# Patient Record
(demographics unavailable — no encounter records)

---

## 2024-10-23 NOTE — HISTORY OF PRESENT ILLNESS
[FreeTextEntry1] : Patient is doing well. I the past few weeks, patient has not been OOB during the day or coughing at night. Patient is able to run around and play. mom is asking for school form. Patient does not have trouble breathing with exercise. Mom states that he has improved a lot, he does not have allergies, cough. Before, he had trouble breathing but has now improved.   No hospitalization, emergency department, urgent care, unscheduled PMD visit for asthma, no systemic steroid, no absence from school// parents take leave because of pt asthma.  symptoms are  controlled by RULES OF TWO's   ID: [id number]

## 2024-10-23 NOTE — DISCUSSION/SUMMARY
[Mild] : Mild persistent [FreeTextEntry1] : Patient is a 10 year old male presenting for asthma evaluation. We will be able to write a better for the school. We recommend him getting 2 puffs before exercise. Since it's getting cold outside and he runs in the cold, this is more likely to trigger an asthma attack. Will give a school note for administration of albuterol prior to exercise. Recommend to go to nurse to get the treatment at school. If it happens more than once a month will order treatment. Encouraged to take all medicines for the winter, and in the spring will decrease medicine depending on symptom improvement. Recommend return to clinic in 3-4 months.   discuss asthma management plan for high risk season, controller adjustment ,  discuss exacerbation  recognition,  guardian expressed understanding.  discussed rx for exercise induced asthma Influenza vaccine and recommended vaccines recommended .Discussed trigger and environmental control,  Action/ plan discussed with verbal understanding, (please see patient discussion, assessment  sections and patient  education above )   total time spent  30 minutes

## 2024-10-23 NOTE — HISTORY OF PRESENT ILLNESS
[FreeTextEntry1] : Patient is doing well. I the past few weeks, patient has not been OOB during the day or coughing at night. Patient is able to run around and play. mom is asking for school form. Patient does not have trouble breathing with exercise. Mom states that he has improved a lot, he does not have allergies, cough. Before, he had trouble breathing but has now improved.   No hospitalization, emergency department, urgent care, unscheduled PMD visit for asthma, no systemic steroid, no absence from school// parents take leave because of pt asthma.  symptoms are  controlled by RULES OF TWO's   ID: [id number]

## 2024-10-24 NOTE — HISTORY OF PRESENT ILLNESS
[de-identified] : FOLLOWUP VISIT FOR: Obesity and abnormal liver function test.  He has gained weight since his last visit.  He is followed by nutrition.  Most recent labs show an abnormal ALT of 70 with a normal AST.  Labs prior to this on 3- showed a normal AST and an ALT of 97.  The ALT appears to be downtrending.  Clinically he is asymptomatic.  There is no complaint of abdominal pain, vomiting, constipation, blood in the stool  AGGRAVATING FACTORS: None  ALLEVIATING FACTORS: None  PERTINENT NEGATIVES: No cough or fever   INDEPENDENT HISTORIAN: Mother  REVIEW OF EXTERNAL NOTES: Note from JONO Rolon on 9- was reviewed  REVIEW OF RESULTS: Labs from 9- were reviewed.  The hepatic panel showed a normal AST and an abnormal ALT  TESTS ORDERED: Abdominal ultrasound  INDEPENDENT INTERPRETATION OF TESTS PERFORMED BY ANOTHER PROVIDER:  Labs from 9- were reviewed.  The CMP, cholesterol, triglycerides and CBC were within normal limits

## 2024-10-24 NOTE — CONSULT LETTER
[Dear  ___] : Dear  [unfilled], [Consult Letter:] : I had the pleasure of evaluating your patient, [unfilled]. [Please see my note below.] : Please see my note below. [Consult Closing:] : Thank you very much for allowing me to participate in the care of this patient.  If you have any questions, please do not hesitate to contact me. [Sincerely,] : Sincerely, [FreeTextEntry3] : Latoya Ferreira M.D. Director of Pediatric Gastroenterology and Nutrition [facility]

## 2024-10-24 NOTE — HISTORY OF PRESENT ILLNESS
[de-identified] : FOLLOWUP VISIT FOR: Obesity and abnormal liver function test.  He has gained weight since his last visit.  He is followed by nutrition.  Most recent labs show an abnormal ALT of 70 with a normal AST.  Labs prior to this on 3- showed a normal AST and an ALT of 97.  The ALT appears to be downtrending.  Clinically he is asymptomatic.  There is no complaint of abdominal pain, vomiting, constipation, blood in the stool  AGGRAVATING FACTORS: None  ALLEVIATING FACTORS: None  PERTINENT NEGATIVES: No cough or fever   INDEPENDENT HISTORIAN: Mother  REVIEW OF EXTERNAL NOTES: Note from JONO Rolon on 9- was reviewed  REVIEW OF RESULTS: Labs from 9- were reviewed.  The hepatic panel showed a normal AST and an abnormal ALT  TESTS ORDERED: Abdominal ultrasound  INDEPENDENT INTERPRETATION OF TESTS PERFORMED BY ANOTHER PROVIDER:  Labs from 9- were reviewed.  The CMP, cholesterol, triglycerides and CBC were within normal limits

## 2024-10-24 NOTE — CONSULT LETTER
[Dear  ___] : Dear  [unfilled], [Consult Letter:] : I had the pleasure of evaluating your patient, [unfilled]. [Please see my note below.] : Please see my note below. [Consult Closing:] : Thank you very much for allowing me to participate in the care of this patient.  If you have any questions, please do not hesitate to contact me. [Sincerely,] : Sincerely, [FreeTextEntry3] : Latoya Ferreira M.D. Director of Pediatric Gastroenterology and Nutrition [facility]

## 2025-01-10 NOTE — RISK ASSESSMENT
[Eats meals with family] : eats meals with family [Has family members/adults to turn to for help] : has family members/adults to turn to for help [Is permitted and is able to make independent decisions] : Is permitted and is able to make independent decisions [Grade: ____] : Grade: [unfilled] [Normal Performance] : normal performance [Normal Behavior/Attention] : normal behavior/attention [Normal Homework] : normal homework [Eats regular meals including adequate fruits and vegetables] : eats regular meals including adequate fruits and vegetables [Drinks non-sweetened liquids] : drinks non-sweetened liquids  [Calcium source] : calcium source [Has concerns about body or appearance] : does not have concerns about body or appearance [Has friends] : has friends [At least 1 hour of physical activity a day] : at least 1 hour of physical activity a day [Screen time (except homework) less than 2 hours a day] : no screen time (except homework) less than 2 hours a day [Has interests/participates in community activities/volunteers] : has interests/participates in community activities/volunteers [Uses tobacco] : does not use tobacco [Uses drugs] : does not use drugs  [Drinks alcohol] : does not drink alcohol [Home is free of violence] : home is free of violence [Uses safety belts/safety equipment] : uses safety belts/safety equipment  [Has peer relationships free of violence] : has peer relationships free of violence [Has/had oral sex] : has not had oral sex [Has had sexual intercourse] : has not had sexual intercourse [Has ways to cope with stress] : has ways to cope with stress [Displays self-confidence] : displays self-confidence [Has problems with sleep] : does not have problems with sleep [Gets depressed, anxious, or irritable/has mood swings] : does not get depressed, anxious, or irritable/has mood swings [Has thought about hurting self or considered suicide] : has not thought about hurting self or considered suicide [With Teen] : teen

## 2025-01-13 NOTE — HISTORY OF PRESENT ILLNESS
[Mother] : mother [2%] : 2%  milk  [Fruit] : fruit [Vegetables] : vegetables [Meat] : meat [Grains] : grains [Eggs] : eggs [Fish] : fish [Dairy] : dairy [Vitamins] : takes vitamins  [Eats healthy meals and snacks] : eats healthy meals and snacks [Eats meals with family] : eats meals with family [___ stools per day] : [unfilled]  stools per day [___ stools every other day] : [unfilled]  stools every other day [Firm] : stools are firm consistency [___ voids per day] : [unfilled] voids per day [Normal] : Normal [In own bed] : In own bed [Wakes up at night] : wakes up at night [Sleeps ___ hours per night] : sleeps [unfilled] hours per night [Yes] : Patient goes to dentist yearly [Playtime (60 min/d)] : playtime 60 min a day [Participates in after-school activities] : participates in after-school activities [TV in bedroom] : tv in bedroom [Appropiate parent-child-sibling interaction] : appropriate parent-child-sibling interaction [Does chores when asked] : does chores when asked [Has Friends] : has friends [Has chance to make own decisions] : has chance to make own decisions [Grade ___] : Grade [unfilled] [Parent/teacher concerns] : parent/teacher concerns [Adequate social interactions] : adequate social interactions [Adequate behavior] : adequate behavior [Adequate performance] : adequate performance [Adequate attention] : adequate attention [No difficulties with Homework] : no difficulties with homework [No] : No cigarette smoke exposure [Appropriately restrained in motor vehicle] : appropriately restrained in motor vehicle [Supervised outdoor play] : supervised outdoor play [Supervised around water] : supervised around water [Wears helmet and pads] : wears helmet and pads [Parent knows child's friends] : parent knows child's friends [Parent discusses safety rules regarding adults] : parent discusses safety rules regarding adults [Family discusses home emergency plan] : family discusses home emergency plan [Monitored computer use] : monitored computer use [NO] : No [FreeTextEntry6] : 11yo male with history of asthma who presents for 11yo wellness visit. Patient and mother have no acute concerns at this time. He is eating and sleeping well, and has no issues with breathing. He takes an inhaler twice a day and has a PRN inhaler 4-6 hrs which he usually takes prior to when he exercises or plays sports. Mother would like a refill on both. Patient also endorses aching, right-sided headaches that occur after he exercises and gets "sweaty and warm." It goes away in 1-2 hours and he denies dizziness/light-headedness. [Exposure to alcohol] : no exposure to alcohol [Exposure to tobacco] : no exposure to tobacco [Exposure to electronic nicotine delivery system] : No exposure to electronic nicotine delivery system [Exposure to illicit drugs] : no exposure to illicit drugs [FreeTextEntry7] : Pt presents for 10 yo WCC [de-identified] : No concerns except Mom conerned that he becomes sweaty and warm when exercising. [FreeTextEntry3] : sleeps with parents [de-identified] : brushes once a day [de-identified] : unsure about fluoride treatment. Sees dentist every 6 months, no concerns.  [de-identified] : Not had flu vaccine  [FreeTextEntry1] : asthma - twice a day , 1 4-6hrs as needed (when he runs or exercises) uses albuterol prior to exercse - needs refill on both nebulizer soln and MDI  aching headache, right sided, goes away in 1-2 hours. after he exercises and sweats. no dizziness or lightheaded. starts when he stops exercising.

## 2025-01-13 NOTE — PHYSICAL EXAM
[NL] : warm, clear [Dry] : dry [de-identified] : dry skin on left elbow [Alert] : alert [No Acute Distress] : no acute distress [Normocephalic] : normocephalic [Conjunctivae with no discharge] : conjunctivae with no discharge [PERRL] : PERRL [EOMI Bilateral] : EOMI bilateral [Auricles Well Formed] : auricles well formed [Clear Tympanic membranes with present light reflex and bony landmarks] : clear tympanic membranes with present light reflex and bony landmarks [No Discharge] : no discharge [Nares Patent] : nares patent [Pink Nasal Mucosa] : pink nasal mucosa [Palate Intact] : palate intact [Nonerythematous Oropharynx] : nonerythematous oropharynx [Supple, full passive range of motion] : supple, full passive range of motion [No Palpable Masses] : no palpable masses [Symmetric Chest Rise] : symmetric chest rise [Clear to Auscultation Bilaterally] : clear to auscultation bilaterally [Regular Rate and Rhythm] : regular rate and rhythm [Normal S1, S2 present] : normal S1, S2 present [No Murmurs] : no murmurs [+2 Femoral Pulses] : +2 femoral pulses [Soft] : soft [NonTender] : non tender [Non Distended] : non distended [Normoactive Bowel Sounds] : normoactive bowel sounds [No Hepatomegaly] : no hepatomegaly [No Splenomegaly] : no splenomegaly [Testicles Descended Bilaterally] : testicles descended bilaterally [Patent] : patent [No fissures] : no fissures [No Abnormal Lymph Nodes Palpated] : no abnormal lymph nodes palpated [No Gait Asymmetry] : no gait asymmetry [No pain or deformities with palpation of bone, muscles, joints] : no pain or deformities with palpation of bone, muscles, joints [Normal Muscle Tone] : normal muscle tone [Straight] : straight [+2 Patella DTR] : +2 patella DTR [Cranial Nerves Grossly Intact] : cranial nerves grossly intact [No Rash or Lesions] : no rash or lesions

## 2025-01-13 NOTE — PHYSICAL EXAM
[NL] : warm, clear [Dry] : dry [de-identified] : dry skin on left elbow [Alert] : alert [No Acute Distress] : no acute distress [Normocephalic] : normocephalic [Conjunctivae with no discharge] : conjunctivae with no discharge [PERRL] : PERRL [EOMI Bilateral] : EOMI bilateral [Auricles Well Formed] : auricles well formed [Clear Tympanic membranes with present light reflex and bony landmarks] : clear tympanic membranes with present light reflex and bony landmarks [No Discharge] : no discharge [Nares Patent] : nares patent [Pink Nasal Mucosa] : pink nasal mucosa [Palate Intact] : palate intact [Nonerythematous Oropharynx] : nonerythematous oropharynx [Supple, full passive range of motion] : supple, full passive range of motion [No Palpable Masses] : no palpable masses [Symmetric Chest Rise] : symmetric chest rise [Clear to Auscultation Bilaterally] : clear to auscultation bilaterally [Regular Rate and Rhythm] : regular rate and rhythm [Normal S1, S2 present] : normal S1, S2 present [No Murmurs] : no murmurs [+2 Femoral Pulses] : +2 femoral pulses [Soft] : soft [NonTender] : non tender [Non Distended] : non distended [Normoactive Bowel Sounds] : normoactive bowel sounds [No Hepatomegaly] : no hepatomegaly [No Splenomegaly] : no splenomegaly [Testicles Descended Bilaterally] : testicles descended bilaterally [Patent] : patent [No fissures] : no fissures [No Abnormal Lymph Nodes Palpated] : no abnormal lymph nodes palpated [No Gait Asymmetry] : no gait asymmetry [No pain or deformities with palpation of bone, muscles, joints] : no pain or deformities with palpation of bone, muscles, joints [Normal Muscle Tone] : normal muscle tone [Straight] : straight [+2 Patella DTR] : +2 patella DTR [Cranial Nerves Grossly Intact] : cranial nerves grossly intact [No Rash or Lesions] : no rash or lesions

## 2025-01-13 NOTE — HISTORY OF PRESENT ILLNESS
[Mother] : mother [2%] : 2%  milk  [Fruit] : fruit [Vegetables] : vegetables [Meat] : meat [Grains] : grains [Eggs] : eggs [Fish] : fish [Dairy] : dairy [Vitamins] : takes vitamins  [Eats healthy meals and snacks] : eats healthy meals and snacks [Eats meals with family] : eats meals with family [___ stools per day] : [unfilled]  stools per day [___ stools every other day] : [unfilled]  stools every other day [Firm] : stools are firm consistency [___ voids per day] : [unfilled] voids per day [Normal] : Normal [In own bed] : In own bed [Wakes up at night] : wakes up at night [Sleeps ___ hours per night] : sleeps [unfilled] hours per night [Yes] : Patient goes to dentist yearly [Playtime (60 min/d)] : playtime 60 min a day [Participates in after-school activities] : participates in after-school activities [TV in bedroom] : tv in bedroom [Appropiate parent-child-sibling interaction] : appropriate parent-child-sibling interaction [Does chores when asked] : does chores when asked [Has Friends] : has friends [Has chance to make own decisions] : has chance to make own decisions [Grade ___] : Grade [unfilled] [Parent/teacher concerns] : parent/teacher concerns [Adequate social interactions] : adequate social interactions [Adequate behavior] : adequate behavior [Adequate performance] : adequate performance [Adequate attention] : adequate attention [No difficulties with Homework] : no difficulties with homework [No] : No cigarette smoke exposure [Appropriately restrained in motor vehicle] : appropriately restrained in motor vehicle [Supervised outdoor play] : supervised outdoor play [Supervised around water] : supervised around water [Wears helmet and pads] : wears helmet and pads [Parent knows child's friends] : parent knows child's friends [Parent discusses safety rules regarding adults] : parent discusses safety rules regarding adults [Family discusses home emergency plan] : family discusses home emergency plan [Monitored computer use] : monitored computer use [NO] : No [FreeTextEntry6] : 11yo male with history of asthma who presents for 11yo wellness visit. Patient and mother have no acute concerns at this time. He is eating and sleeping well, and has no issues with breathing. He takes an inhaler twice a day and has a PRN inhaler 4-6 hrs which he usually takes prior to when he exercises or plays sports. Mother would like a refill on both. Patient also endorses aching, right-sided headaches that occur after he exercises and gets "sweaty and warm." It goes away in 1-2 hours and he denies dizziness/light-headedness. [Exposure to alcohol] : no exposure to alcohol [Exposure to tobacco] : no exposure to tobacco [Exposure to electronic nicotine delivery system] : No exposure to electronic nicotine delivery system [Exposure to illicit drugs] : no exposure to illicit drugs [FreeTextEntry7] : Pt presents for 10 yo WCC [de-identified] : No concerns except Mom conerned that he becomes sweaty and warm when exercising. [FreeTextEntry3] : sleeps with parents [de-identified] : brushes once a day [de-identified] : unsure about fluoride treatment. Sees dentist every 6 months, no concerns.  [de-identified] : Not had flu vaccine  [FreeTextEntry1] : asthma - twice a day , 1 4-6hrs as needed (when he runs or exercises) uses albuterol prior to exercse - needs refill on both nebulizer soln and MDI  aching headache, right sided, goes away in 1-2 hours. after he exercises and sweats. no dizziness or lightheaded. starts when he stops exercising.

## 2025-01-13 NOTE — HISTORY OF PRESENT ILLNESS
[Mother] : mother [2%] : 2%  milk  [Fruit] : fruit [Vegetables] : vegetables [Meat] : meat [Grains] : grains [Eggs] : eggs [Fish] : fish [Dairy] : dairy [Vitamins] : takes vitamins  [Eats healthy meals and snacks] : eats healthy meals and snacks [Eats meals with family] : eats meals with family [___ stools per day] : [unfilled]  stools per day [___ stools every other day] : [unfilled]  stools every other day [Firm] : stools are firm consistency [___ voids per day] : [unfilled] voids per day [Normal] : Normal [In own bed] : In own bed [Wakes up at night] : wakes up at night [Sleeps ___ hours per night] : sleeps [unfilled] hours per night [Yes] : Patient goes to dentist yearly [Playtime (60 min/d)] : playtime 60 min a day [Participates in after-school activities] : participates in after-school activities [TV in bedroom] : tv in bedroom [Appropiate parent-child-sibling interaction] : appropriate parent-child-sibling interaction [Does chores when asked] : does chores when asked [Has Friends] : has friends [Has chance to make own decisions] : has chance to make own decisions [Grade ___] : Grade [unfilled] [Parent/teacher concerns] : parent/teacher concerns [Adequate social interactions] : adequate social interactions [Adequate behavior] : adequate behavior [Adequate performance] : adequate performance [Adequate attention] : adequate attention [No difficulties with Homework] : no difficulties with homework [No] : No cigarette smoke exposure [Appropriately restrained in motor vehicle] : appropriately restrained in motor vehicle [Supervised outdoor play] : supervised outdoor play [Supervised around water] : supervised around water [Wears helmet and pads] : wears helmet and pads [Parent knows child's friends] : parent knows child's friends [Parent discusses safety rules regarding adults] : parent discusses safety rules regarding adults [Family discusses home emergency plan] : family discusses home emergency plan [Monitored computer use] : monitored computer use [NO] : No [FreeTextEntry6] : 9yo male with history of asthma who presents for 9yo wellness visit. Patient and mother have no acute concerns at this time. He is eating and sleeping well, and has no issues with breathing. He takes an inhaler twice a day and has a PRN inhaler 4-6 hrs which he usually takes prior to when he exercises or plays sports. Mother would like a refill on both. Patient also endorses aching, right-sided headaches that occur after he exercises and gets "sweaty and warm." It goes away in 1-2 hours and he denies dizziness/light-headedness. [Exposure to alcohol] : no exposure to alcohol [Exposure to tobacco] : no exposure to tobacco [Exposure to electronic nicotine delivery system] : No exposure to electronic nicotine delivery system [Exposure to illicit drugs] : no exposure to illicit drugs [FreeTextEntry7] : Pt presents for 10 yo WCC [de-identified] : No concerns except Mom conerned that he becomes sweaty and warm when exercising. [FreeTextEntry3] : sleeps with parents [de-identified] : brushes once a day [de-identified] : unsure about fluoride treatment. Sees dentist every 6 months, no concerns.  [de-identified] : Not had flu vaccine  [FreeTextEntry1] : asthma - twice a day , 1 4-6hrs as needed (when he runs or exercises) uses albuterol prior to exercse - needs refill on both nebulizer soln and MDI  aching headache, right sided, goes away in 1-2 hours. after he exercises and sweats. no dizziness or lightheaded. starts when he stops exercising.

## 2025-01-13 NOTE — HISTORY OF PRESENT ILLNESS
[Mother] : mother [2%] : 2%  milk  [Fruit] : fruit [Vegetables] : vegetables [Meat] : meat [Grains] : grains [Eggs] : eggs [Fish] : fish [Dairy] : dairy [Vitamins] : takes vitamins  [Eats healthy meals and snacks] : eats healthy meals and snacks [Eats meals with family] : eats meals with family [___ stools per day] : [unfilled]  stools per day [___ stools every other day] : [unfilled]  stools every other day [Firm] : stools are firm consistency [___ voids per day] : [unfilled] voids per day [Normal] : Normal [In own bed] : In own bed [Wakes up at night] : wakes up at night [Sleeps ___ hours per night] : sleeps [unfilled] hours per night [Yes] : Patient goes to dentist yearly [Playtime (60 min/d)] : playtime 60 min a day [Participates in after-school activities] : participates in after-school activities [TV in bedroom] : tv in bedroom [Appropiate parent-child-sibling interaction] : appropriate parent-child-sibling interaction [Does chores when asked] : does chores when asked [Has Friends] : has friends [Has chance to make own decisions] : has chance to make own decisions [Grade ___] : Grade [unfilled] [Parent/teacher concerns] : parent/teacher concerns [Adequate social interactions] : adequate social interactions [Adequate behavior] : adequate behavior [Adequate performance] : adequate performance [Adequate attention] : adequate attention [No difficulties with Homework] : no difficulties with homework [No] : No cigarette smoke exposure [Appropriately restrained in motor vehicle] : appropriately restrained in motor vehicle [Supervised outdoor play] : supervised outdoor play [Supervised around water] : supervised around water [Wears helmet and pads] : wears helmet and pads [Parent knows child's friends] : parent knows child's friends [Parent discusses safety rules regarding adults] : parent discusses safety rules regarding adults [Family discusses home emergency plan] : family discusses home emergency plan [Monitored computer use] : monitored computer use [NO] : No [FreeTextEntry6] : 11yo male with history of asthma who presents for 11yo wellness visit. Patient and mother have no acute concerns at this time. He is eating and sleeping well, and has no issues with breathing. He takes an inhaler twice a day and has a PRN inhaler 4-6 hrs which he usually takes prior to when he exercises or plays sports. Mother would like a refill on both. Patient also endorses aching, right-sided headaches that occur after he exercises and gets "sweaty and warm." It goes away in 1-2 hours and he denies dizziness/light-headedness. [Exposure to alcohol] : no exposure to alcohol [Exposure to tobacco] : no exposure to tobacco [Exposure to electronic nicotine delivery system] : No exposure to electronic nicotine delivery system [Exposure to illicit drugs] : no exposure to illicit drugs [FreeTextEntry7] : Pt presents for 10 yo WCC [de-identified] : No concerns except Mom conerned that he becomes sweaty and warm when exercising. [FreeTextEntry3] : sleeps with parents [de-identified] : brushes once a day [de-identified] : unsure about fluoride treatment. Sees dentist every 6 months, no concerns.  [de-identified] : Not had flu vaccine  [FreeTextEntry1] : asthma - twice a day , 1 4-6hrs as needed (when he runs or exercises) uses albuterol prior to exercse - needs refill on both nebulizer soln and MDI  aching headache, right sided, goes away in 1-2 hours. after he exercises and sweats. no dizziness or lightheaded. starts when he stops exercising.

## 2025-01-13 NOTE — PHYSICAL EXAM
[NL] : warm, clear [Dry] : dry [de-identified] : dry skin on left elbow [Alert] : alert [No Acute Distress] : no acute distress [Normocephalic] : normocephalic [Conjunctivae with no discharge] : conjunctivae with no discharge [PERRL] : PERRL [EOMI Bilateral] : EOMI bilateral [Auricles Well Formed] : auricles well formed [Clear Tympanic membranes with present light reflex and bony landmarks] : clear tympanic membranes with present light reflex and bony landmarks [No Discharge] : no discharge [Nares Patent] : nares patent [Pink Nasal Mucosa] : pink nasal mucosa [Palate Intact] : palate intact [Nonerythematous Oropharynx] : nonerythematous oropharynx [Supple, full passive range of motion] : supple, full passive range of motion [No Palpable Masses] : no palpable masses [Symmetric Chest Rise] : symmetric chest rise [Clear to Auscultation Bilaterally] : clear to auscultation bilaterally [Regular Rate and Rhythm] : regular rate and rhythm [Normal S1, S2 present] : normal S1, S2 present [No Murmurs] : no murmurs [+2 Femoral Pulses] : +2 femoral pulses [Soft] : soft [NonTender] : non tender [Non Distended] : non distended [Normoactive Bowel Sounds] : normoactive bowel sounds [No Hepatomegaly] : no hepatomegaly [No Splenomegaly] : no splenomegaly [Testicles Descended Bilaterally] : testicles descended bilaterally [Patent] : patent [No fissures] : no fissures [No Abnormal Lymph Nodes Palpated] : no abnormal lymph nodes palpated [No Gait Asymmetry] : no gait asymmetry [No pain or deformities with palpation of bone, muscles, joints] : no pain or deformities with palpation of bone, muscles, joints [Normal Muscle Tone] : normal muscle tone [Straight] : straight [+2 Patella DTR] : +2 patella DTR [Cranial Nerves Grossly Intact] : cranial nerves grossly intact [No Rash or Lesions] : no rash or lesions

## 2025-01-13 NOTE — PHYSICAL EXAM
[NL] : warm, clear [Dry] : dry [de-identified] : dry skin on left elbow [Alert] : alert [No Acute Distress] : no acute distress [Normocephalic] : normocephalic [Conjunctivae with no discharge] : conjunctivae with no discharge [PERRL] : PERRL [EOMI Bilateral] : EOMI bilateral [Auricles Well Formed] : auricles well formed [Clear Tympanic membranes with present light reflex and bony landmarks] : clear tympanic membranes with present light reflex and bony landmarks [No Discharge] : no discharge [Nares Patent] : nares patent [Pink Nasal Mucosa] : pink nasal mucosa [Palate Intact] : palate intact [Nonerythematous Oropharynx] : nonerythematous oropharynx [Supple, full passive range of motion] : supple, full passive range of motion [No Palpable Masses] : no palpable masses [Symmetric Chest Rise] : symmetric chest rise [Clear to Auscultation Bilaterally] : clear to auscultation bilaterally [Regular Rate and Rhythm] : regular rate and rhythm [Normal S1, S2 present] : normal S1, S2 present [No Murmurs] : no murmurs [+2 Femoral Pulses] : +2 femoral pulses [Soft] : soft [NonTender] : non tender [Non Distended] : non distended [Normoactive Bowel Sounds] : normoactive bowel sounds [No Hepatomegaly] : no hepatomegaly [No Splenomegaly] : no splenomegaly [Testicles Descended Bilaterally] : testicles descended bilaterally [Patent] : patent [No fissures] : no fissures [No Abnormal Lymph Nodes Palpated] : no abnormal lymph nodes palpated [No Gait Asymmetry] : no gait asymmetry [No pain or deformities with palpation of bone, muscles, joints] : no pain or deformities with palpation of bone, muscles, joints [Normal Muscle Tone] : normal muscle tone [Straight] : straight [+2 Patella DTR] : +2 patella DTR [Cranial Nerves Grossly Intact] : cranial nerves grossly intact [No Rash or Lesions] : no rash or lesions

## 2025-01-13 NOTE — DISCUSSION/SUMMARY
[Normal Growth] : growth [Normal Development] : development  [No Elimination Concerns] : elimination [Continue Regimen] : feeding [No Skin Concerns] : skin [Normal Sleep Pattern] : sleep [None] : no medical problems [Anticipatory Guidance Given] : Anticipatory guidance addressed as per the history of present illness section [No Vaccines] : no vaccines needed [No Medications] : ~He/She~ is not on any medications [Patient] : patient [Parent/Guardian] : Parent/Guardian [Full Activity without restrictions including Physical Education & Athletics] : Full Activity without restrictions including Physical Education & Athletics [] : The components of the vaccine(s) to be administered today are listed in the plan of care. The disease(s) for which the vaccine(s) are intended to prevent and the risks have been discussed with the caretaker.  The risks are also included in the appropriate vaccination information statements which have been provided to the patient's caregiver.  The caregiver has given consent to vaccinate. [FreeTextEntry1] :   Full Activity without restrictions including Physical Education & Athletics I have examined the above-named student and completed the preparticipation physical evaluation. The athlete does not present apparent clinical contraindications to practice and participate in sport(s) as outlined above. A copy of the physical exam is on record in my office and can be made available to the school at the request of the parents. If conditions arise after the athlete has been cleared for participation, the physician may rescind the clearance until the problem is resolved and the potential consequences are completely explained to the athlete (and parents/guardians)  Quentin is a 10 year old male with PMHx of obesity, allergic rhinitis, elevated LFTs, mild persistent asthma, and vitamin D deficiency who presents for health care maintenance visit. PE remarkable for acanthosis nigricans. Counseled patient on healthy diet and exercise. Respoled bedwetting and urinary incontinence. OBESITY - Recommended healthy nutrition and exercise - Follow up with nutritionist as scheduled - RTC in 3 months for weight check  Vitamin D Deficiency - Continue vitamin D daily as prescribed - Ordered repeat vitamin D level  ASTHMA: - Follow up pulmonolgy Dr. Carrillo PLAN Health Care Maintenance: -Age appropriate anticipatory guidance provided. -Well balanced and nutritious diet reviewed, avoid sweetened beverages. -Encourage physical activity (sports, walks, outdoor activity) -Limit screen time < 2 hours / day -Follow up in 1 year for WCC and 3 months for weight check. -Will send for labs CBC, CMP, Lipids, HbA1C, TSH, Free and total T4, Vitamin D.(all fasting and soonest.

## 2025-03-26 NOTE — ASSESSMENT
[FreeTextEntry1] : spirometry is performed to assess the patient for progress/ evaluation  of baseline asthma (per national asthma management guidelines) result: normal /  exhaled nitrous oxide is performed to assess allergy/ inflammation  result:   <20         (   normal <20, 20-35 likely TH2 driven inflammation >35 significant Th2   driven inflammation ) d/w guardian above results continue to monitor progress continue treatment plan   discuss with guardians disease management, of :-mild persistent asthma further investigations/ referral that may be recommended.  Explain benefits, alternatives and possible side effect of treatment options vs no Rx   cdc recommended vaccines and  Influenza vaccine recommended.  Discussed trigger and environmental control,  Action/ plan discussed with verbal understanding, (please see patient discussion, assessment  sections and patient  education above ) teach to use inhalers and spacers  +/- mask  total time spent in this visit 40 minutes

## 2025-03-26 NOTE — ASSESSMENT
Please advice on the refill below    Xanax 0.5mg  Disp- 60   R- 0    Last filled 5/26/21    Last Seen 5/26/21 [facility]    Upcoming Appt 12/3/21           [FreeTextEntry1] : spirometry is performed to assess the patient for progress/ evaluation  of baseline asthma (per national asthma management guidelines) result: normal /  exhaled nitrous oxide is performed to assess allergy/ inflammation  result:   <20         (   normal <20, 20-35 likely TH2 driven inflammation >35 significant Th2   driven inflammation ) d/w guardian above results continue to monitor progress continue treatment plan   discuss with guardians disease management, of :-mild persistent asthma further investigations/ referral that may be recommended.  Explain benefits, alternatives and possible side effect of treatment options vs no Rx   cdc recommended vaccines and  Influenza vaccine recommended.  Discussed trigger and environmental control,  Action/ plan discussed with verbal understanding, (please see patient discussion, assessment  sections and patient  education above ) teach to use inhalers and spacers  +/- mask  total time spent in this visit 40 minutes

## 2025-03-26 NOTE — HISTORY OF PRESENT ILLNESS
[Snoring] : snoring [Fever] : fever [Sweating Heavily At Night] : night sweats [Nonspecific Pain, Swelling, And Stiffness] : pain [Feelings Of Weakness On Exertion] : exercise intolerance [Coughing Up Sputum] : sputum production [Coughing Up Blood (Hemoptysis)] : hemoptysis [FreeTextEntry1] : Patient was followed for mild persistent asthma The symptoms are  well controlled : Patient is by report          compliant with controller RX  No hospitalization, emergency department, urgent care, unscheduled PMD visit for asthma, no systemic steroid, no absence from school// parents take leave because of pt asthma.  symptoms are          controlled by RULES OF TWO's

## 2025-03-26 NOTE — CONSULT LETTER
[Dear  ___] : Dear  [unfilled], [Consult Letter:] : I had the pleasure of evaluating your patient, [unfilled]. [Please see my note below.] : Please see my note below. [Sincerely,] : Sincerely, [FreeTextEntry3] : Guardians are asked to contact us if there is any concern or questions. The next appointment will be in  8 to 14 weeks Thank you for your referral and the opportunity to contribute to the care of this monica family. Please feel free to contact me at any time, if I may be of further assistance.  Gustavo SIMENTAL FCCP Director of Pediatric Pulmonology Diplomate, Pediatric Pulmonology, American Board of Pediatrics Diplomate, Sleep medicine, American Board of  Pediatrics

## 2025-03-26 NOTE — CONSULT LETTER
[Dear  ___] : Dear  [unfilled], [Consult Letter:] : I had the pleasure of evaluating your patient, [unfilled]. [Please see my note below.] : Please see my note below. [Sincerely,] : Sincerely, [FreeTextEntry3] : Guardians are asked to contact us if there is any concern or questions. The next appointment will be in  8 to 14 weeks Thank you for your referral and the opportunity to contribute to the care of this monica family. Please feel free to contact me at any time, if I may be of further assistance.  Gustavo SIMENTAL FCCP Director of Pediatric Pulmonology Diplomate, Pediatric Pulmonology, American Board of Pediatrics Diplomate, Sleep medicine, American Board of  Pediatrics [facility]

## 2025-03-26 NOTE — REASON FOR VISIT
[Routine Follow-Up] : a routine follow-up visit for [Asthma/RAD] : asthma/RAD [Mother] : mother [FreeTextEntry3] : [id number] [TWNoteComboBox1] : German

## 2025-03-26 NOTE — REASON FOR VISIT
[Routine Follow-Up] : a routine follow-up visit for [Asthma/RAD] : asthma/RAD [Mother] : mother [FreeTextEntry3] : [id number] [TWNoteComboBox1] : [place]

## 2025-04-02 NOTE — HISTORY OF PRESENT ILLNESS
[FreeTextEntry1] :  10 yo M with obesity, asthma, low vit D, presenting for f/u and vaccines. Followed pulmonology, asthma controlled. H/o low vitamin D does not take vitamin D daily. Has seen the nutritionist for weight management. Patient has been going outdoors more often, plays soccer and with his dog. Has been trying to choose more healthy foods like fruit and vegetables.

## 2025-04-02 NOTE — REASON FOR VISIT
[Mother] : mother [Language Line ] : provided by Language Line   [Interpreters_IDNumber] : [id number]  [TWNoteComboBox1] : Afghan [TextEntry] : Patient seen in office for vaccine visit.  RN administered Tdap, Mcv and Hpv. Patient observed post administration and tolerated injections well. No Signs/symptoms of reaction.   - Jenny Patterson RN

## 2025-04-02 NOTE — REASON FOR VISIT
[Consultation Follow Up] : a consultation follow up  [Mother] : mother [Time Spent: ____ minutes] : Total time spent using  services: [unfilled] minutes. The patient's primary language is not English thus required  services. [Interpreters_IDNumber] : [id number] [TWNoteComboBox1] : [place]ese

## 2025-04-02 NOTE — DISCUSSION/SUMMARY
[FreeTextEntry1] :  Physical Exam General:  alert, no acute distress   Head:  normocephalic   Eyes:  conjunctivae with no discharge, PERRL, EOMI bilateral   Ears:  auricles well formed, clear tympanic membranes with present light reflex and bony landmarks   Nose:  no discharge, nares patent, pink nasal mucosa   Mouth:  palate intact, nonerythematous oropharynx   Neck:  supple, full passive range of motion, no palpable masses   Lungs:  symmetric chest rise, clear to auscultation bilaterally   Cardiac:  regular rate and rhythm, normal S1, S2 present, no murmurs, +2 femoral pulses   Abdomen:  soft, non tender, non distended, normoactive bowel sounds, no hepatomegaly, no splenomegaly   Genitalia:  testicles descended bilaterally   Anus:  patent, no fissures   Lymphatics:  no abnormal lymph nodes palpated.   Musculoskeletal:  no gait asymmetry, no pain or deformities with palpation of bone, muscles, joints, normal muscle tone   Spine:  straight   Neurologic:  +2 patella DTR, cranial nerves grossly intact   Skin:  no rash or lesions   A/P: 12 yo M with obesity, asthma, low vit D, presenting f/u and vaccines.   CHILDHOOD OBESITY: BMI 99%. Dietary counseling provided. Healthy diet and lifestyle discussed at length. Avoid sweetened beverages, juice, or soda. My plate planner reviewed. Limit milk to 16 oz/day, use 1% or 2% milk. Portion control reviewed. Include high fiber and whole foods in diet. Exercise counseling: Increase physical activity (bicycle, sports, gymnastics), or interactive electronic programs. Discussed future implications of elevated BMI including risk of metabolic syndrome, diabetes, hypertension, sleep apnea, fatty liver, heart disease and stroke. If any new symptoms, onset of polyuria, polydipsia, fruity odor of breath, abdominal pain, changes of gait, changes in mental status or new symptoms occur seek immediate medical attention.  Low Vit D: Repeat Level.  H/o elevated LFTS: f/u GI.  Return to clinic in 3 months for weight check. The plan above was discussed with the family. Caregiver verbalized understanding and agreement of the aforementioned plan above. All questions and concerns were addressed at this visit.

## 2025-04-02 NOTE — HISTORY OF PRESENT ILLNESS
[de-identified] : 11 year old male with obesity is here for f/u elevated LFT.  Trying to make changes. Exercising daily and cutting out juice/soda in diet Less fried food.